# Patient Record
Sex: MALE | Race: WHITE | NOT HISPANIC OR LATINO | Employment: OTHER | URBAN - METROPOLITAN AREA
[De-identification: names, ages, dates, MRNs, and addresses within clinical notes are randomized per-mention and may not be internally consistent; named-entity substitution may affect disease eponyms.]

---

## 2020-07-09 ENCOUNTER — OFFICE VISIT (OUTPATIENT)
Dept: PODIATRY | Facility: CLINIC | Age: 75
End: 2020-07-09
Payer: MEDICARE

## 2020-07-09 VITALS
RESPIRATION RATE: 18 BRPM | HEIGHT: 67 IN | HEART RATE: 69 BPM | WEIGHT: 255 LBS | DIASTOLIC BLOOD PRESSURE: 83 MMHG | TEMPERATURE: 97 F | SYSTOLIC BLOOD PRESSURE: 132 MMHG | BODY MASS INDEX: 40.02 KG/M2

## 2020-07-09 DIAGNOSIS — E11.9 TYPE 2 DIABETES MELLITUS WITHOUT COMPLICATION, WITHOUT LONG-TERM CURRENT USE OF INSULIN: Primary | ICD-10-CM

## 2020-07-09 DIAGNOSIS — L60.0 INGROWN NAIL: ICD-10-CM

## 2020-07-09 PROCEDURE — 1126F PR PAIN SEVERITY QUANTIFIED, NO PAIN PRESENT: ICD-10-PCS | Mod: S$GLB,,, | Performed by: PODIATRIST

## 2020-07-09 PROCEDURE — 99999 PR PBB SHADOW E&M-NEW PATIENT-LVL IV: ICD-10-PCS | Mod: PBBFAC,,, | Performed by: PODIATRIST

## 2020-07-09 PROCEDURE — 1159F MED LIST DOCD IN RCRD: CPT | Mod: S$GLB,,, | Performed by: PODIATRIST

## 2020-07-09 PROCEDURE — 1126F AMNT PAIN NOTED NONE PRSNT: CPT | Mod: S$GLB,,, | Performed by: PODIATRIST

## 2020-07-09 PROCEDURE — 99202 PR OFFICE/OUTPT VISIT, NEW, LEVL II, 15-29 MIN: ICD-10-PCS | Mod: S$GLB,,, | Performed by: PODIATRIST

## 2020-07-09 PROCEDURE — 1159F PR MEDICATION LIST DOCUMENTED IN MEDICAL RECORD: ICD-10-PCS | Mod: S$GLB,,, | Performed by: PODIATRIST

## 2020-07-09 PROCEDURE — 99999 PR PBB SHADOW E&M-NEW PATIENT-LVL IV: CPT | Mod: PBBFAC,,, | Performed by: PODIATRIST

## 2020-07-09 PROCEDURE — 3008F PR BODY MASS INDEX (BMI) DOCUMENTED: ICD-10-PCS | Mod: CPTII,S$GLB,, | Performed by: PODIATRIST

## 2020-07-09 PROCEDURE — 99202 OFFICE O/P NEW SF 15 MIN: CPT | Mod: S$GLB,,, | Performed by: PODIATRIST

## 2020-07-09 PROCEDURE — 3008F BODY MASS INDEX DOCD: CPT | Mod: CPTII,S$GLB,, | Performed by: PODIATRIST

## 2020-07-09 RX ORDER — MELATONIN 10 MG
CAPSULE ORAL
COMMUNITY

## 2020-07-09 RX ORDER — METOPROLOL TARTRATE 100 MG/1
100 TABLET ORAL 2 TIMES DAILY
COMMUNITY

## 2020-07-09 RX ORDER — RAMIPRIL 10 MG/1
CAPSULE ORAL
COMMUNITY

## 2020-07-09 RX ORDER — UBIDECARENONE 30 MG
30 CAPSULE ORAL 3 TIMES DAILY
COMMUNITY

## 2020-07-09 RX ORDER — METFORMIN HYDROCHLORIDE 500 MG/1
TABLET ORAL
COMMUNITY

## 2020-07-09 RX ORDER — EZETIMIBE 10 MG/1
TABLET ORAL
COMMUNITY
Start: 2020-05-26

## 2020-07-09 RX ORDER — NIACIN 500 MG
250 CAPSULE, EXTENDED RELEASE ORAL NIGHTLY
COMMUNITY

## 2020-07-09 RX ORDER — AMLODIPINE BESYLATE 5 MG/1
TABLET ORAL
COMMUNITY

## 2020-07-12 PROBLEM — E11.9 TYPE 2 DIABETES MELLITUS WITHOUT COMPLICATION, WITHOUT LONG-TERM CURRENT USE OF INSULIN: Status: ACTIVE | Noted: 2020-07-12

## 2020-07-12 PROBLEM — L60.0 INGROWN NAIL: Status: ACTIVE | Noted: 2020-07-12

## 2020-07-12 NOTE — PROGRESS NOTES
Subjective:       Patient ID: Jefry Melo is a 75 y.o. male.    Chief Complaint: Diabetes Mellitus   Patient presents today for new patient diabetic evaluation he is a type 2 diabetic x8 years he states it is difficult for him to properly trim his nails and they have becoming ingrown.    Past Medical History:   Diagnosis Date    Diabetes mellitus type I     Hyperlipidemia     Hypertension      Past Surgical History:   Procedure Laterality Date    EYE SURGERY      IMPLANTATION OF BIVENTRICULAR HEART PACEMAKER      triple bypass       History reviewed. No pertinent family history.  Social History     Socioeconomic History    Marital status:      Spouse name: Not on file    Number of children: Not on file    Years of education: Not on file    Highest education level: Not on file   Occupational History    Not on file   Social Needs    Financial resource strain: Not on file    Food insecurity     Worry: Not on file     Inability: Not on file    Transportation needs     Medical: Not on file     Non-medical: Not on file   Tobacco Use    Smoking status: Former Smoker    Smokeless tobacco: Never Used   Substance and Sexual Activity    Alcohol use: Yes    Drug use: Not Currently    Sexual activity: Not on file   Lifestyle    Physical activity     Days per week: Not on file     Minutes per session: Not on file    Stress: Not on file   Relationships    Social connections     Talks on phone: Not on file     Gets together: Not on file     Attends [employer] witness service: Not on file     Active member of club or organization: Not on file     Attends meetings of clubs or organizations: Not on file     Relationship status: Not on file   Other Topics Concern    Not on file   Social History Narrative    Not on file       Current Outpatient Medications   Medication Sig Dispense Refill    amLODIPine (NORVASC) 5 MG tablet amlodipine 5 mg tablet      co-enzyme Q-10 30 mg capsule Take 30 mg by mouth 3  "(three) times daily.      ezetimibe (ZETIA) 10 mg tablet       melatonin 10 mg Cap melatonin 10 mg capsule   Take 1 capsule every day by oral route.      metFORMIN (GLUCOPHAGE) 500 MG tablet metformin 500 mg tablet      metoprolol tartrate (LOPRESSOR) 100 MG tablet Take 100 mg by mouth 2 (two) times daily.      niacin 500 MG CpSR Take 250 mg by mouth every evening.      ramipriL (ALTACE) 10 MG capsule ramipril 10 mg capsule   take 1 capsule by mouth once daily       No current facility-administered medications for this visit.      Review of patient's allergies indicates:   Allergen Reactions    Penicillins Rash       Review of Systems   Neurological: Positive for numbness.   All other systems reviewed and are negative.      Objective:      Vitals:    07/09/20 0945   BP: 132/83   BP Location: Right arm   Patient Position: Sitting   Pulse: 69   Resp: 18   Temp: 96.7 °F (35.9 °C)   Weight: 115.7 kg (255 lb)   Height: 5' 7" (1.702 m)     Physical Exam  Vitals signs and nursing note reviewed.   Constitutional:       Appearance: Normal appearance.   Cardiovascular:      Pulses: Normal pulses.           Dorsalis pedis pulses are 2+ on the right side and 2+ on the left side.        Posterior tibial pulses are 2+ on the right side and 2+ on the left side.   Pulmonary:      Effort: Pulmonary effort is normal.   Musculoskeletal: Normal range of motion.   Feet:      Right foot:      Protective Sensation: 2 sites tested. 1 site sensed.     Toenail Condition: Right toenails are ingrown. Fungal disease present.     Left foot:      Protective Sensation: 2 sites tested. 1 site sensed.     Toenail Condition: Left toenails are ingrown. Fungal disease present.  Skin:     General: Skin is warm.      Capillary Refill: Capillary refill takes 2 to 3 seconds.      Findings: Erythema present.   Neurological:      General: No focal deficit present.      Mental Status: He is alert.   Psychiatric:         Mood and Affect: Mood normal.    "      Behavior: Behavior normal.         Thought Content: Thought content normal.         Judgment: Judgment normal.              Assessment:       1. Type 2 diabetes mellitus without complication, without long-term current use of insulin    2. Ingrown nail        Plan:        Patient presents today for new patient diabetic evaluation he is a type 2 diabetic x8 years he states it is difficult for him to properly trim his nails and they have becoming ingrown.  A complete new patient diabetic evaluation was performed today patient's pulses are great he does have some very mild neuropathy in the tips of his toes however this is mild protective sensation is noted to be intact when tested with a 5.07 monofilament.  Patient does have early signs of ingrowing nail medial lateral border bilateral hallux these need to be kept trim properly I showed the patient how he needs around the corners off cutting them straight across will allow the corners to [place] become ingrown as they currently are I was able to carefully remove the ingrowing toenail which gave the patient relief there was no need for antibiotics or culture and sensitivity at this time.  Recommended follow-up is is every 4-6 months patient advised any cuts scrapes abrasions areas of redness swelling discomfort he is to contact us immediately diabetic education provided.This note was created using [email or web address] voice recognition software that occasionally misinterpreted phrases or words.